# Patient Record
Sex: MALE | Race: WHITE
[De-identification: names, ages, dates, MRNs, and addresses within clinical notes are randomized per-mention and may not be internally consistent; named-entity substitution may affect disease eponyms.]

---

## 2017-09-05 ENCOUNTER — HOSPITAL ENCOUNTER (EMERGENCY)
Dept: HOSPITAL 80 - FED | Age: 27
Discharge: HOME | End: 2017-09-05
Payer: SELF-PAY

## 2017-09-05 VITALS — DIASTOLIC BLOOD PRESSURE: 74 MMHG | TEMPERATURE: 98.1 F | HEART RATE: 68 BPM | SYSTOLIC BLOOD PRESSURE: 122 MMHG

## 2017-09-05 VITALS — RESPIRATION RATE: 16 BRPM | OXYGEN SATURATION: 97 %

## 2017-09-05 DIAGNOSIS — N34.2: Primary | ICD-10-CM

## 2017-09-05 LAB
COLOR UR: YELLOW
MUCOUS THREADS #/AREA URNS LPF: (no result) /LPF
NITRITE UR QL STRIP: NEGATIVE
PH UR STRIP: 6 [PH] (ref 5–7.5)
RBC #/AREA URNS HPF: (no result) /HPF (ref 0–3)
SP GR UR STRIP: 1.01 (ref 1–1.03)
WBC #/AREA URNS HPF: (no result) /HPF (ref 0–3)

## 2017-09-05 NOTE — EDPHY
H & P


Time Seen by Provider: 09/05/17 22:48


HPI/ROS: 





CHIEF COMPLAINT:  Dysuria, possible STD





HISTORY OF PRESENT ILLNESS:  27-year-old male presents to the emergency 

department by private vehicle with dysuria. Patient states he was treated by 

[facility] with an antibiotic, metronidazole, for possible STD. He 

states his dysuria and penile discharge resolved but then returned a few weeks 

ago. He states he took another antibiotic which he doesn't know which one that 

helped and then symptoms came back again a few days ago. He denies urgency or 

frequency with urination. Denies fevers or chills. He does not think his 

partner has any symptoms.





REVIEW OF SYSTEMS: Denies abdominal pain, back pain, vomiting, fever


Past Medical/Surgical History: 





Negative


Social History: 





Single





Smoking Status: Never smoked


Physical Exam: 





Afebrile. No apparent distress. Lungs are clear to auscultation in all fields. 

Heart is regular rate and rhythm. No CVA tenderness bilaterally. Abdomen is 

soft and nontender. Penis is circumcised. No lesions or discharge noted at 

urinary meatus. No scrotal swelling. Nontender to palpate over epididymal sac 

bilaterally. No inguinal hernia bilaterally.


Constitutional: 


 Initial Vital Signs











Temperature (C)  36.8 C   09/05/17 21:30


 


Heart Rate  67   09/05/17 21:30


 


Respiratory Rate  16   09/05/17 21:30


 


Blood Pressure  134/67 H  09/05/17 21:30


 


O2 Sat (%)  97   09/05/17 21:30








 











O2 Delivery Mode               Room Air














Allergies/Adverse Reactions: 


 





No Known Allergies Allergy (Unverified 09/05/17 21:34)


 








Home Medications: 














 Medication  Instructions  Recorded


 


NK [No Known Home Meds]  09/05/17














MDM/Departure





- MDM


Medications Given: 


 








Discontinued Medications





Azithromycin (Zithromax)  1,000 mg PO EDNOW ONE


   PRN Reason: Protocol


   Stop: 09/05/17 23:15


   Last Admin: 09/05/17 23:24 Dose:  1,000 mg


Ciprofloxacin (Cipro)  500 mg PO EDNOW ONE


   PRN Reason: Protocol


   Stop: 09/05/17 23:15


   Last Admin: 09/05/17 23:24 Dose:  500 mg





ED Course/Re-evaluation: 





Patient is concerned about possible STD. Urine reveals no signs of UTI. He will 

be treated with 1000mg of Zithromax and 500mg of Cipro. Patient was encouraged 

to follow up with urologist on call, Dr. Franklin, since this has been a 

recurring problem. Patient verbalized understanding and agreed. GC/Chlamydia is 

pending. I did encourage HIV and Syphilis testing at [facility] or with 

primary care provider.





He was instructed to return if developed fever, vomiting, penile lesions, or if 

he felt worse in any way.








- Depart


Disposition: Home, Routine, Self-Care


Clinical Impression: 


 Urethritis





Condition: Good


Instructions:  Nonspecific Urethritis in Men (ED)


Additional Instructions: 


Your given 1000 mg Zithromax to treat for possible chlamydia and 500 mg of 

ciprofloxacin to treat for possible gonorrhea.  Call for the results of your 

gonorrhea and chlamydia urine tests in 48 hours.  If these are positive, your 

partner will need to be treated as well.  Abstain from any intercourse until 

your symptoms have resolved and you have been treated.  Return to the emergency 

department if he develop fever, open sores, back pain, vomiting, or if you feel 

worse in any way.


Referrals: 


Armida Franklin MD [Medical Doctor] - 2-3 days, call for appt. (Urologist on-

call)

## 2017-09-06 LAB — CHLAMYDIA AMPLIFICATION GENPRB: NEGATIVE

## 2018-12-17 ENCOUNTER — HOSPITAL ENCOUNTER (EMERGENCY)
Dept: HOSPITAL 80 - FED | Age: 28
Discharge: HOME | End: 2018-12-17
Payer: SELF-PAY

## 2018-12-17 VITALS — SYSTOLIC BLOOD PRESSURE: 130 MMHG | DIASTOLIC BLOOD PRESSURE: 70 MMHG

## 2018-12-17 DIAGNOSIS — D84.9: ICD-10-CM

## 2018-12-17 DIAGNOSIS — H66.001: Primary | ICD-10-CM

## 2018-12-17 NOTE — EDPHY
H & P


Stated Complaint: R EAR PRESSURE,SINUS PRESSURE SICK X 10 DAYS


Time Seen by Provider: 12/17/18 21:09


HPI/ROS: 





CHIEF COMPLAINT:   Right otalgia





HISTORY OF PRESENT ILLNESS:  28-year-old immunocompetent male, nonsmoker, 

complaining of URI symptoms for the past 10 days with development past 24 hr of 

high levels of right otalgia.  No otorrhea.  No barotrauma.  No foreign body 

insertion.  No dizziness.  No nausea or vomiting.  No chest pain.  No nuchal 

rigidity.  No lesions. 





Patient drove himself to the ER











REVIEW OF SYSTEMS:


10 systems reviewed and negative with the exception of the elements mentioned 

in the history of present illness








PAST MEDICAL & SURGICAL  HISTORY:  No pertinent medical or surgical history    





SOCIAL HISTORY: Nonsmoker    














************


PHYSICAL EXAM





(Prior to examination, patient consented to physical exam, hands were washed 

and my usual and customary physical exam procedures followed)


1) GENERAL: Well-developed, well-nourished, alert and oriented.  Appears 

uncomfortable


2) HEAD: Normocephalic, atraumatic


3) HEENT: Pupils equal, round, reactive to light bilaterally.  Sclera 

anicteric.  Nasopharynx, oropharynx, clear, no lesions. Moist Mucous membranes.

  Right ear:  Bulging, erythematous tympanic membrane with no evidence of 

perforation.  EAC clear.  Left ear:  Clear EAC, clear TM with normal anatomic 

landmarks.  Bilateral mastoid nontender non boggy


4) NECK: Full range of motion, no meningeal signs.


5) LUNGS: Clear auscultation bilaterally, no wheezes, no rhonchi, no 

retractions.   


6) HEART: Regular rate and rhythm, no murmur, no heave, no gallop.


7) ABDOMEN: No guarding, no rebound, no focal tenderness, negative McBurney's, 

negative Ornelas's, negative Rovsing's, negative peritoneal sign,


8) MUSCULOSKELETAL: Moving all extremities, no focal areas of tenderness, no 

obvious trauma.  No peripheral edema or discoloration.


9) BACK: No CVA tenderness, no midline vertebral tenderness, no fluctuance, no 

step-off, no obvious trauma, no visual or palpable abnormality. 


10) SKIN: No rash, no petechiae. 


11) Psychiatric:  Patient is oriented X 3, there is no agitation.








***************





DIFFERENTIAL DIAGNOSIS:   In no particular order including but not limited to 

otitis media without perforation, otitis media with perforation, otitis externa








- Personal History


Current Tetanus Diphtheria and Acellular Pertussis (TDAP): Yes





- Medical/Surgical History


Hx Asthma: No


Hx Chronic Respiratory Disease: No


Hx Diabetes: No


Hx Cardiac Disease: No


Hx Renal Disease: No


Hx Cirrhosis: No


Hx Alcoholism: No


Hx HIV/AIDS: No


Hx Splenectomy or Spleen Trauma: No


Other PMH: denies





- Social History


Smoking Status: Never smoked


Constitutional: 


 Initial Vital Signs











Temperature (C)  36.4 C   12/17/18 20:52


 


Heart Rate  82   12/17/18 20:52


 


Respiratory Rate  16   12/17/18 20:52


 


Blood Pressure  130/70 H  12/17/18 20:52


 


O2 Sat (%)  97   12/17/18 20:52








 











O2 Delivery Mode               Room Air














Allergies/Adverse Reactions: 


 





No Known Allergies Allergy (Verified 12/17/18 20:51)


 








Home Medications: 














 Medication  Instructions  Recorded


 


Azithromycin [Zithromax] 500 mg PO DAILY #1 tablet 12/17/18














Departure





- Departure


Disposition: Home, Routine, Self-Care


Clinical Impression: 


Otitis media


Qualifiers:


 Otitis media type: suppurative Chronicity: acute Laterality: right Recurrence: 

not specified as recurrent Spontaneous tympanic membrane rupture: without 

spontaneous rupture Qualified Code(s): H66.001 - Acute suppurative otitis media 

without spontaneous rupture of ear drum, right ear





Condition: Good


Instructions:  Hydrocodone/Acetaminophen (By mouth), Upper Respiratory 

Infection (ED)


Additional Instructions: 


Return to the emergency department immediately for change in breathing habits, 

change in voice, change in swallowing habits, change in mental status, or any 

other symptoms that concern you.


Referrals: 


[facility],. [Clinic] - 2-3 days, if not improved


Prescriptions: 


Azithromycin [Zithromax] 500 mg PO DAILY #1 tablet